# Patient Record
Sex: FEMALE | Race: WHITE | NOT HISPANIC OR LATINO | URBAN - NONMETROPOLITAN AREA
[De-identification: names, ages, dates, MRNs, and addresses within clinical notes are randomized per-mention and may not be internally consistent; named-entity substitution may affect disease eponyms.]

---

## 2018-06-14 PROBLEM — H26.492: Noted: 2018-06-14

## 2018-06-14 PROBLEM — H43.399: Noted: 2018-06-14

## 2018-06-14 PROBLEM — Z96.1: Noted: 2018-06-14

## 2019-07-16 ENCOUNTER — IMPORTED ENCOUNTER (OUTPATIENT)
Dept: URBAN - NONMETROPOLITAN AREA CLINIC 1 | Facility: CLINIC | Age: 74
End: 2019-07-16

## 2019-07-16 PROCEDURE — 92014 COMPRE OPH EXAM EST PT 1/>: CPT

## 2019-07-16 NOTE — PATIENT DISCUSSION
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 stable ou pseudophakia oumonitorEarly PCO-Explained symptoms of advancing PCO. -Continue to monitor for now. Pt will notify us if any new symptoms develop.

## 2020-07-21 ENCOUNTER — IMPORTED ENCOUNTER (OUTPATIENT)
Dept: URBAN - NONMETROPOLITAN AREA CLINIC 1 | Facility: CLINIC | Age: 75
End: 2020-07-21

## 2020-07-21 PROBLEM — Z96.1: Noted: 2018-06-14

## 2020-07-21 PROBLEM — H26.492: Noted: 2018-06-14

## 2020-07-21 PROBLEM — H43.812: Noted: 2020-07-21

## 2020-07-21 PROCEDURE — 92014 COMPRE OPH EXAM EST PT 1/>: CPT

## 2020-07-21 NOTE — PATIENT DISCUSSION
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 stable ou pseudophakia oumonitor FOR PCOEarly PCO-Explained symptoms of advancing PCO. -Continue to monitor for now. Pt will notify us if any new symptoms develop.

## 2020-11-30 NOTE — PATIENT DISCUSSION
Discussed in detail re: nature of condition, dry vs wet AMD, AREDS.  Slightly Increased PED, no fluid, no conversion to wet.

## 2020-11-30 NOTE — PATIENT DISCUSSION
Blaise, Discussed the importance of blood pressure control in the prevention of ocular complications.

## 2021-07-29 ENCOUNTER — IMPORTED ENCOUNTER (OUTPATIENT)
Dept: URBAN - NONMETROPOLITAN AREA CLINIC 1 | Facility: CLINIC | Age: 76
End: 2021-07-29

## 2021-07-29 PROCEDURE — 92014 COMPRE OPH EXAM EST PT 1/>: CPT

## 2021-12-22 ENCOUNTER — IMPORTED ENCOUNTER (OUTPATIENT)
Dept: URBAN - NONMETROPOLITAN AREA CLINIC 1 | Facility: CLINIC | Age: 76
End: 2021-12-22

## 2021-12-22 PROBLEM — H26.492: Noted: 2021-12-22

## 2021-12-22 PROBLEM — Z96.1: Noted: 2021-12-22

## 2021-12-22 PROBLEM — H43.812: Noted: 2021-12-22

## 2021-12-22 PROBLEM — H10.211: Noted: 2021-12-22

## 2021-12-22 PROCEDURE — 99213 OFFICE O/P EST LOW 20 MIN: CPT

## 2021-12-22 NOTE — PATIENT DISCUSSION
CHEMICAL CONJ ODMILD EDUCATE PT ON FINDINGSSTART ALREX TID OD X 3 DAYSSTART PRES FREE TEARS Q2-3 H X 3 DAYSRTC AS SCHPVD os OLD-Retina flat 360 with no breaks tears or heme.-S&S of RD/RT reviewed with pt.-Stressed that pt should contact our office right away with any changes or increase in symptoms. stable ou pseudophakia oumonitor FOR PCOEarly PCO-Explained symptoms of advancing PCO. -Continue to monitor for now. Pt will notify us if any new symptoms develop.

## 2022-04-09 ASSESSMENT — VISUAL ACUITY
OD_CC: 20/20
OS_CC: J1
OD_CC: 20/25
OS_CC: 20/20
OU_CC: J1+
OS_CC: 20/25
OD_CC: 20/20
OD_CC: 20/20
OS_CC: 20/20
OS_CC: 20/25
OD_CC: J1

## 2022-04-09 ASSESSMENT — TONOMETRY
OD_IOP_MMHG: 14
OS_IOP_MMHG: 15
OS_IOP_MMHG: 13
OD_IOP_MMHG: 15
OS_IOP_MMHG: 14
OD_IOP_MMHG: 16
OD_IOP_MMHG: 13

## 2022-08-04 ENCOUNTER — ESTABLISHED PATIENT (OUTPATIENT)
Dept: RURAL CLINIC 1 | Facility: CLINIC | Age: 77
End: 2022-08-04

## 2022-08-04 DIAGNOSIS — H26.492: ICD-10-CM

## 2022-08-04 DIAGNOSIS — H43.812: ICD-10-CM

## 2022-08-04 DIAGNOSIS — Z96.1: ICD-10-CM

## 2022-08-04 PROCEDURE — 92014 COMPRE OPH EXAM EST PT 1/>: CPT

## 2022-08-04 ASSESSMENT — TONOMETRY
OD_IOP_MMHG: 12
OS_IOP_MMHG: 13

## 2022-08-04 ASSESSMENT — VISUAL ACUITY
OS_SC: 20/25
OD_SC: 20/20
OU_SC: 20/20

## 2023-08-07 ENCOUNTER — FOLLOW UP (OUTPATIENT)
Dept: RURAL CLINIC 1 | Facility: CLINIC | Age: 78
End: 2023-08-07

## 2023-08-07 DIAGNOSIS — Z96.1: ICD-10-CM

## 2023-08-07 DIAGNOSIS — H26.492: ICD-10-CM

## 2023-08-07 DIAGNOSIS — H43.812: ICD-10-CM

## 2023-08-07 PROCEDURE — 92014 COMPRE OPH EXAM EST PT 1/>: CPT

## 2023-08-07 ASSESSMENT — VISUAL ACUITY
OD_SC: 20/25
OS_SC: 20/30-2

## 2023-08-07 ASSESSMENT — TONOMETRY
OS_IOP_MMHG: 14
OD_IOP_MMHG: 15

## 2025-02-06 ENCOUNTER — COMPREHENSIVE EXAM (OUTPATIENT)
Age: 80
End: 2025-02-06

## 2025-02-06 DIAGNOSIS — Z96.1: ICD-10-CM

## 2025-02-06 DIAGNOSIS — H26.492: ICD-10-CM

## 2025-02-06 DIAGNOSIS — H43.812: ICD-10-CM

## 2025-02-06 PROCEDURE — 92014 COMPRE OPH EXAM EST PT 1/>: CPT
